# Patient Record
Sex: MALE | Race: ASIAN | NOT HISPANIC OR LATINO | URBAN - METROPOLITAN AREA
[De-identification: names, ages, dates, MRNs, and addresses within clinical notes are randomized per-mention and may not be internally consistent; named-entity substitution may affect disease eponyms.]

---

## 2017-01-20 ENCOUNTER — INPATIENT (INPATIENT)
Facility: HOSPITAL | Age: 42
LOS: 4 days | Discharge: HOME | End: 2017-01-25
Attending: INTERNAL MEDICINE | Admitting: INTERNAL MEDICINE
Payer: MEDICAID

## 2017-01-20 VITALS
SYSTOLIC BLOOD PRESSURE: 144 MMHG | DIASTOLIC BLOOD PRESSURE: 93 MMHG | RESPIRATION RATE: 18 BRPM | HEART RATE: 81 BPM | TEMPERATURE: 98 F | OXYGEN SATURATION: 100 %

## 2017-01-20 RX ORDER — KETOROLAC TROMETHAMINE 30 MG/ML
60 SYRINGE (ML) INJECTION ONCE
Qty: 0 | Refills: 0 | Status: DISCONTINUED | OUTPATIENT
Start: 2017-01-20 | End: 2017-01-20

## 2017-01-20 RX ORDER — DIAZEPAM 5 MG
1 TABLET ORAL
Qty: 8 | Refills: 0 | OUTPATIENT
Start: 2017-01-20 | End: 2017-01-24

## 2017-01-20 RX ORDER — IBUPROFEN 200 MG
1 TABLET ORAL
Qty: 20 | Refills: 0 | OUTPATIENT
Start: 2017-01-20 | End: 2017-01-25

## 2017-01-20 RX ADMIN — Medication 60 MILLIGRAM(S): at 23:27

## 2017-01-20 RX ADMIN — Medication 60 MILLIGRAM(S): at 22:01

## 2017-01-20 NOTE — ED PROVIDER NOTE - MEDICAL DECISION MAKING DETAILS
42 y/o M pt with PMHx of chronic back pain c/o sudden onset back pain x10 hours s/p exertion. Plan: pain medication including valium Toradol, apply ice pack and reassess

## 2017-01-20 NOTE — ED PROVIDER NOTE - OBJECTIVE STATEMENT
40 y/o M pt with PMHx of HTN BIB EMS for sudden onset of back pain s/p pushing a suitcase 10 hours ago worse with movement. States he has a hx of back pain. Pt had physical therapy last week. Denies weakness, numbness, fall, trauma or any other complaints.

## 2017-01-20 NOTE — ED PROVIDER NOTE - PROGRESS NOTE DETAILS
Pt unable to sit up due to pain or even roll side to side. Able to lift legs +4/5 strength due to pain b/l. Sensation intact. Pain persistent after Toradol, Oxycodone and Valium. Will give Morphine and valium. Signed out to me as admission pending basic labs. Labs MYLENEL. GENIE.

## 2017-01-20 NOTE — ED PROVIDER NOTE - NS ED MD SCRIBE ATTENDING SCRIBE SECTIONS
VITAL SIGNS( Pullset)/REVIEW OF SYSTEMS/PHYSICAL EXAM/HIV/PAST MEDICAL/SURGICAL/SOCIAL HISTORY/HISTORY OF PRESENT ILLNESS/DISPOSITION

## 2017-01-21 DIAGNOSIS — E78.00 PURE HYPERCHOLESTEROLEMIA, UNSPECIFIED: ICD-10-CM

## 2017-01-21 DIAGNOSIS — S39.012A STRAIN OF MUSCLE, FASCIA AND TENDON OF LOWER BACK, INITIAL ENCOUNTER: ICD-10-CM

## 2017-01-21 DIAGNOSIS — M54.5 LOW BACK PAIN: ICD-10-CM

## 2017-01-21 DIAGNOSIS — E11.9 TYPE 2 DIABETES MELLITUS WITHOUT COMPLICATIONS: ICD-10-CM

## 2017-01-21 DIAGNOSIS — I10 ESSENTIAL (PRIMARY) HYPERTENSION: ICD-10-CM

## 2017-01-21 LAB
ALBUMIN SERPL ELPH-MCNC: 4.3 G/DL — SIGNIFICANT CHANGE UP (ref 3.3–5)
ALP SERPL-CCNC: 73 U/L — SIGNIFICANT CHANGE UP (ref 40–120)
ALT FLD-CCNC: 64 U/L — HIGH (ref 4–41)
APPEARANCE UR: CLEAR — SIGNIFICANT CHANGE UP
APTT BLD: 30.5 SEC — SIGNIFICANT CHANGE UP (ref 27.5–37.4)
AST SERPL-CCNC: 38 U/L — SIGNIFICANT CHANGE UP (ref 4–40)
BASOPHILS # BLD AUTO: 0.04 K/UL — SIGNIFICANT CHANGE UP (ref 0–0.2)
BASOPHILS # BLD AUTO: 0.04 K/UL — SIGNIFICANT CHANGE UP (ref 0–0.2)
BASOPHILS NFR BLD AUTO: 0.4 % — SIGNIFICANT CHANGE UP (ref 0–2)
BASOPHILS NFR BLD AUTO: 0.6 % — SIGNIFICANT CHANGE UP (ref 0–2)
BILIRUB SERPL-MCNC: 0.4 MG/DL — SIGNIFICANT CHANGE UP (ref 0.2–1.2)
BILIRUB UR-MCNC: NEGATIVE — SIGNIFICANT CHANGE UP
BLOOD UR QL VISUAL: NEGATIVE — SIGNIFICANT CHANGE UP
BUN SERPL-MCNC: 12 MG/DL — SIGNIFICANT CHANGE UP (ref 7–23)
BUN SERPL-MCNC: 13 MG/DL — SIGNIFICANT CHANGE UP (ref 7–23)
CALCIUM SERPL-MCNC: 9.3 MG/DL — SIGNIFICANT CHANGE UP (ref 8.4–10.5)
CALCIUM SERPL-MCNC: 9.8 MG/DL — SIGNIFICANT CHANGE UP (ref 8.4–10.5)
CHLORIDE SERPL-SCNC: 97 MMOL/L — LOW (ref 98–107)
CHLORIDE SERPL-SCNC: 99 MMOL/L — SIGNIFICANT CHANGE UP (ref 98–107)
CHOLEST SERPL-MCNC: 167 MG/DL — SIGNIFICANT CHANGE UP (ref 120–199)
CK SERPL-CCNC: 137 U/L — SIGNIFICANT CHANGE UP (ref 30–200)
CO2 SERPL-SCNC: 25 MMOL/L — SIGNIFICANT CHANGE UP (ref 22–31)
CO2 SERPL-SCNC: 26 MMOL/L — SIGNIFICANT CHANGE UP (ref 22–31)
COLOR SPEC: YELLOW — SIGNIFICANT CHANGE UP
CREAT SERPL-MCNC: 0.83 MG/DL — SIGNIFICANT CHANGE UP (ref 0.5–1.3)
CREAT SERPL-MCNC: 0.91 MG/DL — SIGNIFICANT CHANGE UP (ref 0.5–1.3)
CRP SERPL HS-MCNC: 5.6 MG/L — SIGNIFICANT CHANGE UP
CRP SERPL-MCNC: 5.9 MG/L — HIGH (ref 0.3–5)
EOSINOPHIL # BLD AUTO: 0.11 K/UL — SIGNIFICANT CHANGE UP (ref 0–0.5)
EOSINOPHIL # BLD AUTO: 0.12 K/UL — SIGNIFICANT CHANGE UP (ref 0–0.5)
EOSINOPHIL NFR BLD AUTO: 1.3 % — SIGNIFICANT CHANGE UP (ref 0–6)
EOSINOPHIL NFR BLD AUTO: 1.5 % — SIGNIFICANT CHANGE UP (ref 0–6)
ERYTHROCYTE [SEDIMENTATION RATE] IN BLOOD: 12 MM/HR — SIGNIFICANT CHANGE UP (ref 1–15)
FERRITIN SERPL-MCNC: 128.9 NG/ML — SIGNIFICANT CHANGE UP (ref 30–400)
FOLATE SERPL-MCNC: 12.4 NG/ML — SIGNIFICANT CHANGE UP (ref 4.7–20)
GLUCOSE SERPL-MCNC: 108 MG/DL — HIGH (ref 70–99)
GLUCOSE SERPL-MCNC: 125 MG/DL — HIGH (ref 70–99)
GLUCOSE UR-MCNC: NEGATIVE — SIGNIFICANT CHANGE UP
HBA1C BLD-MCNC: 7.1 % — HIGH (ref 4–5.6)
HCT VFR BLD CALC: 42.3 % — SIGNIFICANT CHANGE UP (ref 39–50)
HCT VFR BLD CALC: 42.7 % — SIGNIFICANT CHANGE UP (ref 39–50)
HDLC SERPL-MCNC: 32 MG/DL — LOW (ref 35–55)
HGB BLD-MCNC: 13.7 G/DL — SIGNIFICANT CHANGE UP (ref 13–17)
HGB BLD-MCNC: 13.7 G/DL — SIGNIFICANT CHANGE UP (ref 13–17)
HYALINE CASTS # UR AUTO: SIGNIFICANT CHANGE UP (ref 0–?)
IMM GRANULOCYTES NFR BLD AUTO: 0.4 % — SIGNIFICANT CHANGE UP (ref 0–1.5)
IMM GRANULOCYTES NFR BLD AUTO: 0.6 % — SIGNIFICANT CHANGE UP (ref 0–1.5)
INR BLD: 1.09 — SIGNIFICANT CHANGE UP (ref 0.87–1.18)
IRON SATN MFR SERPL: 373 UG/DL — SIGNIFICANT CHANGE UP (ref 155–535)
IRON SATN MFR SERPL: 80 UG/DL — SIGNIFICANT CHANGE UP (ref 45–165)
KETONES UR-MCNC: NEGATIVE — SIGNIFICANT CHANGE UP
LEUKOCYTE ESTERASE UR-ACNC: NEGATIVE — SIGNIFICANT CHANGE UP
LIPID PNL WITH DIRECT LDL SERPL: 115 MG/DL — SIGNIFICANT CHANGE UP
LYMPHOCYTES # BLD AUTO: 2.86 K/UL — SIGNIFICANT CHANGE UP (ref 1–3.3)
LYMPHOCYTES # BLD AUTO: 3.61 K/UL — HIGH (ref 1–3.3)
LYMPHOCYTES # BLD AUTO: 38.3 % — SIGNIFICANT CHANGE UP (ref 13–44)
LYMPHOCYTES # BLD AUTO: 39.4 % — SIGNIFICANT CHANGE UP (ref 13–44)
MAGNESIUM SERPL-MCNC: 2.1 MG/DL — SIGNIFICANT CHANGE UP (ref 1.6–2.6)
MCHC RBC-ENTMCNC: 26.7 PG — LOW (ref 27–34)
MCHC RBC-ENTMCNC: 26.9 PG — LOW (ref 27–34)
MCHC RBC-ENTMCNC: 32.1 % — SIGNIFICANT CHANGE UP (ref 32–36)
MCHC RBC-ENTMCNC: 32.4 % — SIGNIFICANT CHANGE UP (ref 32–36)
MCV RBC AUTO: 82.9 FL — SIGNIFICANT CHANGE UP (ref 80–100)
MCV RBC AUTO: 83.1 FL — SIGNIFICANT CHANGE UP (ref 80–100)
MONOCYTES # BLD AUTO: 0.63 K/UL — SIGNIFICANT CHANGE UP (ref 0–0.9)
MONOCYTES # BLD AUTO: 0.64 K/UL — SIGNIFICANT CHANGE UP (ref 0–0.9)
MONOCYTES NFR BLD AUTO: 6.7 % — SIGNIFICANT CHANGE UP (ref 2–14)
MONOCYTES NFR BLD AUTO: 8.8 % — SIGNIFICANT CHANGE UP (ref 2–14)
MUCOUS THREADS # UR AUTO: SIGNIFICANT CHANGE UP
NEUTROPHILS # BLD AUTO: 3.56 K/UL — SIGNIFICANT CHANGE UP (ref 1.8–7.4)
NEUTROPHILS # BLD AUTO: 4.99 K/UL — SIGNIFICANT CHANGE UP (ref 1.8–7.4)
NEUTROPHILS NFR BLD AUTO: 49.1 % — SIGNIFICANT CHANGE UP (ref 43–77)
NEUTROPHILS NFR BLD AUTO: 52.9 % — SIGNIFICANT CHANGE UP (ref 43–77)
NITRITE UR-MCNC: NEGATIVE — SIGNIFICANT CHANGE UP
NON-SQ EPI CELLS # UR AUTO: <1 — SIGNIFICANT CHANGE UP
PH UR: 6.5 — SIGNIFICANT CHANGE UP (ref 4.6–8)
PHOSPHATE SERPL-MCNC: 4.4 MG/DL — SIGNIFICANT CHANGE UP (ref 2.5–4.5)
PLATELET # BLD AUTO: 277 K/UL — SIGNIFICANT CHANGE UP (ref 150–400)
PLATELET # BLD AUTO: 286 K/UL — SIGNIFICANT CHANGE UP (ref 150–400)
PMV BLD: 11.4 FL — SIGNIFICANT CHANGE UP (ref 7–13)
PMV BLD: 12 FL — SIGNIFICANT CHANGE UP (ref 7–13)
POTASSIUM SERPL-MCNC: 4 MMOL/L — SIGNIFICANT CHANGE UP (ref 3.5–5.3)
POTASSIUM SERPL-MCNC: 4.1 MMOL/L — SIGNIFICANT CHANGE UP (ref 3.5–5.3)
POTASSIUM SERPL-SCNC: 4 MMOL/L — SIGNIFICANT CHANGE UP (ref 3.5–5.3)
POTASSIUM SERPL-SCNC: 4.1 MMOL/L — SIGNIFICANT CHANGE UP (ref 3.5–5.3)
PROT SERPL-MCNC: 7.5 G/DL — SIGNIFICANT CHANGE UP (ref 6–8.3)
PROT UR-MCNC: NEGATIVE — SIGNIFICANT CHANGE UP
PROTHROM AB SERPL-ACNC: 12.4 SEC — SIGNIFICANT CHANGE UP (ref 10–13.1)
RBC # BLD: 5.1 M/UL — SIGNIFICANT CHANGE UP (ref 4.2–5.8)
RBC # BLD: 5.14 M/UL — SIGNIFICANT CHANGE UP (ref 4.2–5.8)
RBC # FLD: 13.5 % — SIGNIFICANT CHANGE UP (ref 10.3–14.5)
RBC # FLD: 13.6 % — SIGNIFICANT CHANGE UP (ref 10.3–14.5)
RHEUMATOID FACT SERPL-ACNC: 7.4 IU/ML — SIGNIFICANT CHANGE UP
SODIUM SERPL-SCNC: 139 MMOL/L — SIGNIFICANT CHANGE UP (ref 135–145)
SODIUM SERPL-SCNC: 140 MMOL/L — SIGNIFICANT CHANGE UP (ref 135–145)
SP GR SPEC: 1.01 — SIGNIFICANT CHANGE UP (ref 1–1.03)
TRIGL SERPL-MCNC: 178 MG/DL — HIGH (ref 10–149)
TSH SERPL-MCNC: 4.05 UIU/ML — SIGNIFICANT CHANGE UP (ref 0.27–4.2)
UIBC SERPL-MCNC: 293 UG/DL — SIGNIFICANT CHANGE UP (ref 110–370)
UROBILINOGEN FLD QL: NORMAL E.U. — SIGNIFICANT CHANGE UP (ref 0.1–0.2)
VIT B12 SERPL-MCNC: 700 PG/ML — SIGNIFICANT CHANGE UP (ref 200–900)
WBC # BLD: 7.25 K/UL — SIGNIFICANT CHANGE UP (ref 3.8–10.5)
WBC # BLD: 9.43 K/UL — SIGNIFICANT CHANGE UP (ref 3.8–10.5)
WBC # FLD AUTO: 7.25 K/UL — SIGNIFICANT CHANGE UP (ref 3.8–10.5)
WBC # FLD AUTO: 9.43 K/UL — SIGNIFICANT CHANGE UP (ref 3.8–10.5)
WBC UR QL: SIGNIFICANT CHANGE UP (ref 0–?)

## 2017-01-21 PROCEDURE — 99223 1ST HOSP IP/OBS HIGH 75: CPT

## 2017-01-21 PROCEDURE — 72100 X-RAY EXAM L-S SPINE 2/3 VWS: CPT | Mod: 26

## 2017-01-21 PROCEDURE — 71010: CPT | Mod: 26

## 2017-01-21 RX ORDER — INSULIN LISPRO 100/ML
VIAL (ML) SUBCUTANEOUS
Qty: 0 | Refills: 0 | Status: DISCONTINUED | OUTPATIENT
Start: 2017-01-21 | End: 2017-01-25

## 2017-01-21 RX ORDER — DOCUSATE SODIUM 100 MG
100 CAPSULE ORAL THREE TIMES A DAY
Qty: 0 | Refills: 0 | Status: DISCONTINUED | OUTPATIENT
Start: 2017-01-21 | End: 2017-01-25

## 2017-01-21 RX ORDER — SENNA PLUS 8.6 MG/1
2 TABLET ORAL AT BEDTIME
Qty: 0 | Refills: 0 | Status: DISCONTINUED | OUTPATIENT
Start: 2017-01-21 | End: 2017-01-25

## 2017-01-21 RX ORDER — DILTIAZEM HCL 120 MG
120 CAPSULE, EXT RELEASE 24 HR ORAL DAILY
Qty: 0 | Refills: 0 | Status: DISCONTINUED | OUTPATIENT
Start: 2017-01-21 | End: 2017-01-25

## 2017-01-21 RX ORDER — SIMVASTATIN 20 MG/1
1 TABLET, FILM COATED ORAL
Qty: 0 | Refills: 0 | COMMUNITY

## 2017-01-21 RX ORDER — MORPHINE SULFATE 50 MG/1
2 CAPSULE, EXTENDED RELEASE ORAL EVERY 4 HOURS
Qty: 0 | Refills: 0 | Status: DISCONTINUED | OUTPATIENT
Start: 2017-01-21 | End: 2017-01-22

## 2017-01-21 RX ORDER — DEXTROSE 50 % IN WATER 50 %
12.5 SYRINGE (ML) INTRAVENOUS ONCE
Qty: 0 | Refills: 0 | Status: DISCONTINUED | OUTPATIENT
Start: 2017-01-21 | End: 2017-01-25

## 2017-01-21 RX ORDER — LIDOCAINE 4 G/100G
2 CREAM TOPICAL DAILY
Qty: 0 | Refills: 0 | Status: DISCONTINUED | OUTPATIENT
Start: 2017-01-21 | End: 2017-01-25

## 2017-01-21 RX ORDER — INFLUENZA VIRUS VACCINE 15; 15; 15; 15 UG/.5ML; UG/.5ML; UG/.5ML; UG/.5ML
0.5 SUSPENSION INTRAMUSCULAR ONCE
Qty: 0 | Refills: 0 | Status: COMPLETED | OUTPATIENT
Start: 2017-01-21 | End: 2017-01-21

## 2017-01-21 RX ORDER — DEXTROSE 50 % IN WATER 50 %
1 SYRINGE (ML) INTRAVENOUS ONCE
Qty: 0 | Refills: 0 | Status: DISCONTINUED | OUTPATIENT
Start: 2017-01-21 | End: 2017-01-25

## 2017-01-21 RX ORDER — DEXTROSE 50 % IN WATER 50 %
25 SYRINGE (ML) INTRAVENOUS ONCE
Qty: 0 | Refills: 0 | Status: DISCONTINUED | OUTPATIENT
Start: 2017-01-21 | End: 2017-01-25

## 2017-01-21 RX ORDER — HEPARIN SODIUM 5000 [USP'U]/ML
5000 INJECTION INTRAVENOUS; SUBCUTANEOUS EVERY 8 HOURS
Qty: 0 | Refills: 0 | Status: DISCONTINUED | OUTPATIENT
Start: 2017-01-21 | End: 2017-01-25

## 2017-01-21 RX ORDER — METFORMIN HYDROCHLORIDE 850 MG/1
250 TABLET ORAL
Qty: 0 | Refills: 0 | COMMUNITY

## 2017-01-21 RX ORDER — MORPHINE SULFATE 50 MG/1
4 CAPSULE, EXTENDED RELEASE ORAL EVERY 4 HOURS
Qty: 0 | Refills: 0 | Status: DISCONTINUED | OUTPATIENT
Start: 2017-01-21 | End: 2017-01-22

## 2017-01-21 RX ORDER — MORPHINE SULFATE 50 MG/1
4 CAPSULE, EXTENDED RELEASE ORAL ONCE
Qty: 0 | Refills: 0 | Status: DISCONTINUED | OUTPATIENT
Start: 2017-01-21 | End: 2017-01-21

## 2017-01-21 RX ORDER — INSULIN LISPRO 100/ML
VIAL (ML) SUBCUTANEOUS AT BEDTIME
Qty: 0 | Refills: 0 | Status: DISCONTINUED | OUTPATIENT
Start: 2017-01-21 | End: 2017-01-25

## 2017-01-21 RX ORDER — CYCLOBENZAPRINE HYDROCHLORIDE 10 MG/1
10 TABLET, FILM COATED ORAL
Qty: 0 | Refills: 0 | Status: DISCONTINUED | OUTPATIENT
Start: 2017-01-21 | End: 2017-01-25

## 2017-01-21 RX ORDER — GLUCAGON INJECTION, SOLUTION 0.5 MG/.1ML
1 INJECTION, SOLUTION SUBCUTANEOUS ONCE
Qty: 0 | Refills: 0 | Status: DISCONTINUED | OUTPATIENT
Start: 2017-01-21 | End: 2017-01-25

## 2017-01-21 RX ORDER — SIMVASTATIN 20 MG/1
20 TABLET, FILM COATED ORAL AT BEDTIME
Qty: 0 | Refills: 0 | Status: DISCONTINUED | OUTPATIENT
Start: 2017-01-21 | End: 2017-01-25

## 2017-01-21 RX ORDER — ONDANSETRON 8 MG/1
4 TABLET, FILM COATED ORAL EVERY 8 HOURS
Qty: 0 | Refills: 0 | Status: DISCONTINUED | OUTPATIENT
Start: 2017-01-21 | End: 2017-01-25

## 2017-01-21 RX ORDER — DIAZEPAM 5 MG
5 TABLET ORAL
Qty: 0 | Refills: 0 | Status: DISCONTINUED | OUTPATIENT
Start: 2017-01-21 | End: 2017-01-25

## 2017-01-21 RX ORDER — SODIUM CHLORIDE 9 MG/ML
1000 INJECTION, SOLUTION INTRAVENOUS
Qty: 0 | Refills: 0 | Status: DISCONTINUED | OUTPATIENT
Start: 2017-01-21 | End: 2017-01-25

## 2017-01-21 RX ADMIN — MORPHINE SULFATE 4 MILLIGRAM(S): 50 CAPSULE, EXTENDED RELEASE ORAL at 07:19

## 2017-01-21 RX ADMIN — Medication 120 MILLIGRAM(S): at 07:17

## 2017-01-21 RX ADMIN — LIDOCAINE 2 PATCH: 4 CREAM TOPICAL at 19:57

## 2017-01-21 RX ADMIN — CYCLOBENZAPRINE HYDROCHLORIDE 10 MILLIGRAM(S): 10 TABLET, FILM COATED ORAL at 18:08

## 2017-01-21 RX ADMIN — HEPARIN SODIUM 5000 UNIT(S): 5000 INJECTION INTRAVENOUS; SUBCUTANEOUS at 07:17

## 2017-01-21 RX ADMIN — HEPARIN SODIUM 5000 UNIT(S): 5000 INJECTION INTRAVENOUS; SUBCUTANEOUS at 22:31

## 2017-01-21 RX ADMIN — MORPHINE SULFATE 4 MILLIGRAM(S): 50 CAPSULE, EXTENDED RELEASE ORAL at 05:22

## 2017-01-21 RX ADMIN — HEPARIN SODIUM 5000 UNIT(S): 5000 INJECTION INTRAVENOUS; SUBCUTANEOUS at 16:06

## 2017-01-21 RX ADMIN — Medication 100 MILLIGRAM(S): at 07:17

## 2017-01-21 RX ADMIN — LIDOCAINE 2 PATCH: 4 CREAM TOPICAL at 07:19

## 2017-01-21 RX ADMIN — SIMVASTATIN 20 MILLIGRAM(S): 20 TABLET, FILM COATED ORAL at 22:31

## 2017-01-21 RX ADMIN — Medication 100 MILLIGRAM(S): at 22:31

## 2017-01-21 RX ADMIN — Medication 5 MILLIGRAM(S): at 18:08

## 2017-01-21 RX ADMIN — MORPHINE SULFATE 4 MILLIGRAM(S): 50 CAPSULE, EXTENDED RELEASE ORAL at 16:16

## 2017-01-21 RX ADMIN — Medication 100 MILLIGRAM(S): at 16:06

## 2017-01-21 NOTE — H&P ADULT. - MUSCULOSKELETAL
details… detailed exam decreased ROM due to pain/no joint warmth/no joint erythema/no joint swelling/no calf tenderness

## 2017-01-21 NOTE — H&P ADULT. - ASSESSMENT
42 y/o male EX Smoker, HTN, DM, High Cholesterol, pt c/o sudden severe LBP x one day, while he was pushing a heavy Suitcase yesterday  at home, No Radiation to legs, no numbness in legs, but cannot  move due to severe lumbar spine pain, pain worse with movements and raising his legs, no fever, no rash,  no CP, NO SOB, no cough, no incontinent, no dysuria, no diarrhea, no constipation,   No HA, no dizziness, no abdominal pain, A+O x3, pt had LBP 6-7 months ago due to heavy lifting but improved with PT, pt never had X Ray of L/S or MRI of L/S, I ordered X Ray and MRI of L/S R/O Disc Herniation or FX, pt s/p Valium 5 mg PO x 2 doses, IV Morphine 4 mg X 1 , Toradol IV X 1 and Percocet PO x 1 so far with little help,

## 2017-01-21 NOTE — H&P ADULT. - HISTORY OF PRESENT ILLNESS
42 y/o male EX Smoker, HTN, DM, High Cholesterol, pt c/o sudden severe LBP x one day, while he was pushing a heavy Suitcase yesterday  at home, No Radiation to legs, no numbness in legs, but cannot  move due to severe lumbar spine pain, pain worse with movements and raising his legs, no fever, no rash,  no CP, NO SOB, no cough, no incontinent, no dysuria, no diarrhea, no constipation,   No HA, no dizziness, no abdominal pain, A+O x3, pt had LBP 6-7 months ago due to heavy lifting but improved with PT, pt never had X Ray of L/S or MRI of L/S, I ordered X Ray and MRI of L/S R/O Disc Herniation or FX, pt s/p Valium 5 mg PO x 2 doses, IV Morphine 4 mg X 1 , Toradol IV X 1 and Percocet PO x 1 so far with little help,     Labs: Na 139, K+ 4.1, BUN 12, Creatinine 0.91, Glucose 125, WBC 9.43, Hgb 13.7, Platelet 286    Vitals: Tem 98, HR 78, /76, RR 18, 98 % RA

## 2017-01-21 NOTE — PATIENT PROFILE ADULT. - LIVES WITH, PROFILE
(2 children - 5 y/o and 3 y/o), apartment, 8-9 steps-to-enter/exit into lobby w/ U/L handrail, (+) elevator access/children/spouse

## 2017-01-21 NOTE — ED ADULT NURSE REASSESSMENT NOTE - NS ED NURSE REASSESS COMMENT FT1
Upon assessment, pt is aox3. No acute distress. Reports that back pain is improving. Went back to sleep.

## 2017-01-21 NOTE — H&P ADULT. - PROBLEM SELECTOR PLAN 1
R/O FX, R/O Disc Herniation, R/O Spinal Stenosis  X ray L/S, MRI L/S no contrast, Hold PT for now until imaging done  Pain control, Colace, Senna, UA, IV Morphine PRN, PO Valium, PO Flexeril,   Fall/aspiration precaution   Lidoderm Patch   FABIAN, ESR, CRP, CBC, CMP, RF , CPK, Anti Marley-1 Ab

## 2017-01-21 NOTE — H&P ADULT. - ATTENDING COMMENTS
Pt was seen & Examined by me, Dr. KENDRA Loyola on 1/21/2017.    Dr. Clayton will resume the care of pt  from this morning.

## 2017-01-21 NOTE — ED ADULT NURSE NOTE - OBJECTIVE STATEMENT
Pt received to intake 10c aaox4 non-ambulatory d/t pain c/o sudden onset back pain while pushing luggage  Pt reports hx of chronic back pain and HTN.  Pt denies any numbness or tingling in extremities, exhibits full ROM but pain increases with movement.  Positive peripheral pulses palpated bilaterally.  Labs and meds as ordered.  Will continue to monitor.

## 2017-01-22 LAB
ALBUMIN SERPL ELPH-MCNC: 4.4 G/DL — SIGNIFICANT CHANGE UP (ref 3.3–5)
ALP SERPL-CCNC: 75 U/L — SIGNIFICANT CHANGE UP (ref 40–120)
ALT FLD-CCNC: 81 U/L — HIGH (ref 4–41)
AST SERPL-CCNC: 46 U/L — HIGH (ref 4–40)
BASOPHILS # BLD AUTO: 0.03 K/UL — SIGNIFICANT CHANGE UP (ref 0–0.2)
BASOPHILS NFR BLD AUTO: 0.4 % — SIGNIFICANT CHANGE UP (ref 0–2)
BILIRUB SERPL-MCNC: 0.4 MG/DL — SIGNIFICANT CHANGE UP (ref 0.2–1.2)
BUN SERPL-MCNC: 16 MG/DL — SIGNIFICANT CHANGE UP (ref 7–23)
CALCIUM SERPL-MCNC: 9.4 MG/DL — SIGNIFICANT CHANGE UP (ref 8.4–10.5)
CHLORIDE SERPL-SCNC: 99 MMOL/L — SIGNIFICANT CHANGE UP (ref 98–107)
CK SERPL-CCNC: 108 U/L — SIGNIFICANT CHANGE UP (ref 30–200)
CO2 SERPL-SCNC: 26 MMOL/L — SIGNIFICANT CHANGE UP (ref 22–31)
CREAT SERPL-MCNC: 0.9 MG/DL — SIGNIFICANT CHANGE UP (ref 0.5–1.3)
ENA JO1 AB SER-ACNC: <0.2 ZZ — SIGNIFICANT CHANGE UP
EOSINOPHIL # BLD AUTO: 0.07 K/UL — SIGNIFICANT CHANGE UP (ref 0–0.5)
EOSINOPHIL NFR BLD AUTO: 1 % — SIGNIFICANT CHANGE UP (ref 0–6)
GLUCOSE SERPL-MCNC: 112 MG/DL — HIGH (ref 70–99)
HAV IGM SER-ACNC: NONREACTIVE — SIGNIFICANT CHANGE UP
HBV CORE IGM SER-ACNC: NONREACTIVE — SIGNIFICANT CHANGE UP
HBV SURFACE AG SER-ACNC: NONREACTIVE — SIGNIFICANT CHANGE UP
HCT VFR BLD CALC: 43.3 % — SIGNIFICANT CHANGE UP (ref 39–50)
HCV AB S/CO SERPL IA: 0.38 S/CO — SIGNIFICANT CHANGE UP
HCV AB SERPL-IMP: SIGNIFICANT CHANGE UP
HGB BLD-MCNC: 13.7 G/DL — SIGNIFICANT CHANGE UP (ref 13–17)
IMM GRANULOCYTES NFR BLD AUTO: 0.7 % — SIGNIFICANT CHANGE UP (ref 0–1.5)
LYMPHOCYTES # BLD AUTO: 2.97 K/UL — SIGNIFICANT CHANGE UP (ref 1–3.3)
LYMPHOCYTES # BLD AUTO: 40.7 % — SIGNIFICANT CHANGE UP (ref 13–44)
MAGNESIUM SERPL-MCNC: 2.2 MG/DL — SIGNIFICANT CHANGE UP (ref 1.6–2.6)
MCHC RBC-ENTMCNC: 26.2 PG — LOW (ref 27–34)
MCHC RBC-ENTMCNC: 31.6 % — LOW (ref 32–36)
MCV RBC AUTO: 83 FL — SIGNIFICANT CHANGE UP (ref 80–100)
MONOCYTES # BLD AUTO: 0.42 K/UL — SIGNIFICANT CHANGE UP (ref 0–0.9)
MONOCYTES NFR BLD AUTO: 5.8 % — SIGNIFICANT CHANGE UP (ref 2–14)
NEUTROPHILS # BLD AUTO: 3.76 K/UL — SIGNIFICANT CHANGE UP (ref 1.8–7.4)
NEUTROPHILS NFR BLD AUTO: 51.4 % — SIGNIFICANT CHANGE UP (ref 43–77)
PHOSPHATE SERPL-MCNC: 4.4 MG/DL — SIGNIFICANT CHANGE UP (ref 2.5–4.5)
PLATELET # BLD AUTO: 269 K/UL — SIGNIFICANT CHANGE UP (ref 150–400)
PMV BLD: 11.8 FL — SIGNIFICANT CHANGE UP (ref 7–13)
POTASSIUM SERPL-MCNC: 4.8 MMOL/L — SIGNIFICANT CHANGE UP (ref 3.5–5.3)
POTASSIUM SERPL-SCNC: 4.8 MMOL/L — SIGNIFICANT CHANGE UP (ref 3.5–5.3)
PROT SERPL-MCNC: 7.2 G/DL — SIGNIFICANT CHANGE UP (ref 6–8.3)
RBC # BLD: 5.22 M/UL — SIGNIFICANT CHANGE UP (ref 4.2–5.8)
RBC # FLD: 13.6 % — SIGNIFICANT CHANGE UP (ref 10.3–14.5)
SODIUM SERPL-SCNC: 140 MMOL/L — SIGNIFICANT CHANGE UP (ref 135–145)
T4 FREE SERPL-MCNC: 1.19 NG/DL — SIGNIFICANT CHANGE UP (ref 0.9–1.8)
WBC # BLD: 7.3 K/UL — SIGNIFICANT CHANGE UP (ref 3.8–10.5)
WBC # FLD AUTO: 7.3 K/UL — SIGNIFICANT CHANGE UP (ref 3.8–10.5)

## 2017-01-22 RX ORDER — OXYCODONE HYDROCHLORIDE 5 MG/1
15 TABLET ORAL EVERY 12 HOURS
Qty: 0 | Refills: 0 | Status: DISCONTINUED | OUTPATIENT
Start: 2017-01-22 | End: 2017-01-22

## 2017-01-22 RX ORDER — OXYCODONE HYDROCHLORIDE 5 MG/1
10 TABLET ORAL EVERY 12 HOURS
Qty: 0 | Refills: 0 | Status: DISCONTINUED | OUTPATIENT
Start: 2017-01-22 | End: 2017-01-25

## 2017-01-22 RX ORDER — OXYCODONE HYDROCHLORIDE 5 MG/1
5 TABLET ORAL EVERY 6 HOURS
Qty: 0 | Refills: 0 | Status: DISCONTINUED | OUTPATIENT
Start: 2017-01-22 | End: 2017-01-25

## 2017-01-22 RX ADMIN — Medication 100 MILLIGRAM(S): at 13:08

## 2017-01-22 RX ADMIN — MORPHINE SULFATE 2 MILLIGRAM(S): 50 CAPSULE, EXTENDED RELEASE ORAL at 09:23

## 2017-01-22 RX ADMIN — CYCLOBENZAPRINE HYDROCHLORIDE 10 MILLIGRAM(S): 10 TABLET, FILM COATED ORAL at 17:59

## 2017-01-22 RX ADMIN — OXYCODONE HYDROCHLORIDE 10 MILLIGRAM(S): 5 TABLET ORAL at 17:59

## 2017-01-22 RX ADMIN — Medication 5 MILLIGRAM(S): at 17:59

## 2017-01-22 RX ADMIN — HEPARIN SODIUM 5000 UNIT(S): 5000 INJECTION INTRAVENOUS; SUBCUTANEOUS at 13:08

## 2017-01-22 RX ADMIN — Medication 100 MILLIGRAM(S): at 21:07

## 2017-01-22 RX ADMIN — LIDOCAINE 2 PATCH: 4 CREAM TOPICAL at 12:19

## 2017-01-22 RX ADMIN — Medication 120 MILLIGRAM(S): at 05:49

## 2017-01-22 RX ADMIN — HEPARIN SODIUM 5000 UNIT(S): 5000 INJECTION INTRAVENOUS; SUBCUTANEOUS at 21:07

## 2017-01-22 RX ADMIN — HEPARIN SODIUM 5000 UNIT(S): 5000 INJECTION INTRAVENOUS; SUBCUTANEOUS at 05:49

## 2017-01-22 RX ADMIN — Medication 5 MILLIGRAM(S): at 05:49

## 2017-01-22 RX ADMIN — CYCLOBENZAPRINE HYDROCHLORIDE 10 MILLIGRAM(S): 10 TABLET, FILM COATED ORAL at 05:49

## 2017-01-22 RX ADMIN — SIMVASTATIN 20 MILLIGRAM(S): 20 TABLET, FILM COATED ORAL at 21:07

## 2017-01-22 RX ADMIN — Medication 100 MILLIGRAM(S): at 05:49

## 2017-01-22 RX ADMIN — MORPHINE SULFATE 2 MILLIGRAM(S): 50 CAPSULE, EXTENDED RELEASE ORAL at 09:45

## 2017-01-22 NOTE — DISCHARGE NOTE ADULT - HOSPITAL COURSE
40 y/o male EX Smoker, HTN, DM, High Cholesterol, pt c/o sudden severe LBP x one day, while he was pushing a heavy Suitcase yesterday  at home, No Radiation to legs, no numbness in legs, but cannot  move due to severe lumbar spine pain, pain worse with movements and raising his legs, no fever, no rash,  no CP, NO SOB, no cough, no incontinent, no dysuria, no diarrhea, no constipation,     Hospital Course:    ACUTE LUMBAR BACK PAIN   - R/O FX, R/O Disc Herniation, R/O Spinal Stenosis  -X ray L/S, No evidence of acute compression deformity or traumatic malalignment of  the lumbar spine.  -CXR:There are no focal lung consolidations.  -MRI L-Spine: Posterior annular tear at L4-L5 and L5-S1. At L4-L5, there is a small central disc protrusion with mild canal and bilateral foraminal narrowing.  -Pain control  -Fall/aspiration precaution   -Lidoderm Patch   -VANE-1 AB: neg   Evaluated by neuro spine, rec for pain control, PT, WBAT and outpatient follow up  Physical Therapy:  WBAT, rec for outpatient PT    HTN   -Cardizem CD,     DM  -A1C: 7.0  -monitor FS  -corrective sliding scale   Restart metformin upon d/c    dispo: home with outpatient PT

## 2017-01-22 NOTE — DISCHARGE NOTE ADULT - PROVIDER TOKENS
FREE:[LAST:[Maryjane],FIRST:[Jayashree],PHONE:[phone]],FAX:[(   )    -]],TOKEN:'[id number]'

## 2017-01-22 NOTE — DISCHARGE NOTE ADULT - MEDICATION SUMMARY - MEDICATIONS TO TAKE
I will START or STAY ON the medications listed below when I get home from the hospital:    oxyCODONE 10 mg oral tablet, extended release  -- 1 tab(s) by mouth every 12 hours MDD:2 tablets  -- Indication: For pain    oxyCODONE 5 mg oral tablet  -- 1 tab(s) by mouth every 6 hours, As needed, moderate to severe pain MDD:4 tablets  -- Indication: For pain    Diltiazem Hydrochloride  mg/24 hours oral capsule, extended release  -- 1 cap(s) by mouth once a day  -- Indication: For Hypertension    metFORMIN  -- 250 milligram(s) by mouth once a day  -- Indication: For Diabetes    Zocor 20 mg oral tablet  -- 1 tab(s) by mouth once a day (at bedtime)  -- Indication: For Hyperlipidemia    senna oral tablet  -- 2 tab(s) by mouth once a day (at bedtime), As needed, Constipation  -- Indication: For Constipation    docusate sodium 100 mg oral capsule  -- 1 cap(s) by mouth 3 times a day  -- Indication: For Constipation    cyclobenzaprine 10 mg oral tablet  -- 1 tab(s) by mouth 2 times a day  -- Indication: For muscle pain

## 2017-01-22 NOTE — DISCHARGE NOTE ADULT - CARE PROVIDER_API CALL
Jayashree Wesley  Phone: [phone]  Fax: (   )    -    Enrique Davis), Orthopaedic Surgery  [address] [place] [address]  Phone: [phone]  Fax: [phone]

## 2017-01-22 NOTE — DISCHARGE NOTE ADULT - PATIENT PORTAL LINK FT
“You can access the FollowHealth Patient Portal, offered by [facility], by registering with the following website: [email or web address]”

## 2017-01-22 NOTE — DISCHARGE NOTE ADULT - PLAN OF CARE
improved During your stay images of your back were obtained.    You were evaluated by an orthopedic spine doctor during your stay who determined that your can ambulate as tolerated.  You were evaluated by physical therapy and they recommended outpatient physical therapy to help improve your status. Continue with a diabetic, consistent carbohydrate diet  Continue metformin as prescribed  Follow up with your primary care doctor for review of medications and further diabetic management continue taking cardizem as prescribed  monitor your blood pressure  follow up with your primary care doctor continue taking your zocor as prescribed During your stay images of your back were obtained.    You were evaluated by an orthopedic spine doctor during your stay who determined that your can ambulate as tolerated.  You were evaluated by physical therapy and they recommended outpatient physical therapy to help improve your status.  Follow up with your primary care doct Dr. Wesley for further medical management

## 2017-01-22 NOTE — DISCHARGE NOTE ADULT - CARE PROVIDERS DIRECT ADDRESSES
,DirectAddress_Unknown,[email or web address],DirectAddress_Unknown

## 2017-01-22 NOTE — DISCHARGE NOTE ADULT - CARE PLAN
Principal Discharge DX:	Acute lumbar back pain  Goal:	improved  Instructions for follow-up, activity and diet:	During your stay images of your back were obtained.    You were evaluated by an orthopedic spine doctor during your stay who determined that your can ambulate as tolerated.  You were evaluated by physical therapy and they recommended outpatient physical therapy to help improve your status.  Secondary Diagnosis:	HTN (hypertension)  Instructions for follow-up, activity and diet:	continue taking cardizem as prescribed  monitor your blood pressure  follow up with your primary care doctor  Secondary Diagnosis:	High cholesterol  Instructions for follow-up, activity and diet:	continue taking your zocor as prescribed  Secondary Diagnosis:	Diabetes  Instructions for follow-up, activity and diet:	Continue with a diabetic, consistent carbohydrate diet  Continue metformin as prescribed  Follow up with your primary care doctor for review of medications and further diabetic management Principal Discharge DX:	Acute lumbar back pain  Goal:	improved  Instructions for follow-up, activity and diet:	During your stay images of your back were obtained.    You were evaluated by an orthopedic spine doctor during your stay who determined that your can ambulate as tolerated.  You were evaluated by physical therapy and they recommended outpatient physical therapy to help improve your status.  Follow up with your primary care doct Dr. Wesley for further medical management  Secondary Diagnosis:	HTN (hypertension)  Instructions for follow-up, activity and diet:	continue taking cardizem as prescribed  monitor your blood pressure  follow up with your primary care doctor  Secondary Diagnosis:	High cholesterol  Instructions for follow-up, activity and diet:	continue taking your zocor as prescribed  Secondary Diagnosis:	Diabetes  Instructions for follow-up, activity and diet:	Continue with a diabetic, consistent carbohydrate diet  Continue metformin as prescribed  Follow up with your primary care doctor for review of medications and further diabetic management

## 2017-01-23 LAB
BUN SERPL-MCNC: 13 MG/DL — SIGNIFICANT CHANGE UP (ref 7–23)
CALCIUM SERPL-MCNC: 8.9 MG/DL — SIGNIFICANT CHANGE UP (ref 8.4–10.5)
CHLORIDE SERPL-SCNC: 98 MMOL/L — SIGNIFICANT CHANGE UP (ref 98–107)
CO2 SERPL-SCNC: 24 MMOL/L — SIGNIFICANT CHANGE UP (ref 22–31)
CREAT SERPL-MCNC: 0.84 MG/DL — SIGNIFICANT CHANGE UP (ref 0.5–1.3)
GLUCOSE SERPL-MCNC: 107 MG/DL — HIGH (ref 70–99)
HBA1C BLD-MCNC: 7 % — HIGH (ref 4–5.6)
HCT VFR BLD CALC: 40.6 % — SIGNIFICANT CHANGE UP (ref 39–50)
HGB BLD-MCNC: 13 G/DL — SIGNIFICANT CHANGE UP (ref 13–17)
MCHC RBC-ENTMCNC: 26.7 PG — LOW (ref 27–34)
MCHC RBC-ENTMCNC: 32 % — SIGNIFICANT CHANGE UP (ref 32–36)
MCV RBC AUTO: 83.4 FL — SIGNIFICANT CHANGE UP (ref 80–100)
PLATELET # BLD AUTO: 246 K/UL — SIGNIFICANT CHANGE UP (ref 150–400)
PMV BLD: 11.9 FL — SIGNIFICANT CHANGE UP (ref 7–13)
POTASSIUM SERPL-MCNC: 4 MMOL/L — SIGNIFICANT CHANGE UP (ref 3.5–5.3)
POTASSIUM SERPL-SCNC: 4 MMOL/L — SIGNIFICANT CHANGE UP (ref 3.5–5.3)
RBC # BLD: 4.87 M/UL — SIGNIFICANT CHANGE UP (ref 4.2–5.8)
RBC # FLD: 13.6 % — SIGNIFICANT CHANGE UP (ref 10.3–14.5)
SODIUM SERPL-SCNC: 137 MMOL/L — SIGNIFICANT CHANGE UP (ref 135–145)
WBC # BLD: 7.07 K/UL — SIGNIFICANT CHANGE UP (ref 3.8–10.5)
WBC # FLD AUTO: 7.07 K/UL — SIGNIFICANT CHANGE UP (ref 3.8–10.5)

## 2017-01-23 RX ADMIN — HEPARIN SODIUM 5000 UNIT(S): 5000 INJECTION INTRAVENOUS; SUBCUTANEOUS at 21:28

## 2017-01-23 RX ADMIN — SIMVASTATIN 20 MILLIGRAM(S): 20 TABLET, FILM COATED ORAL at 23:15

## 2017-01-23 RX ADMIN — Medication 120 MILLIGRAM(S): at 05:12

## 2017-01-23 RX ADMIN — OXYCODONE HYDROCHLORIDE 10 MILLIGRAM(S): 5 TABLET ORAL at 05:12

## 2017-01-23 RX ADMIN — HEPARIN SODIUM 5000 UNIT(S): 5000 INJECTION INTRAVENOUS; SUBCUTANEOUS at 05:12

## 2017-01-23 RX ADMIN — OXYCODONE HYDROCHLORIDE 10 MILLIGRAM(S): 5 TABLET ORAL at 18:17

## 2017-01-23 RX ADMIN — Medication 5 MILLIGRAM(S): at 05:16

## 2017-01-23 RX ADMIN — Medication 5 MILLIGRAM(S): at 18:17

## 2017-01-23 RX ADMIN — Medication 100 MILLIGRAM(S): at 05:12

## 2017-01-23 RX ADMIN — LIDOCAINE 2 PATCH: 4 CREAM TOPICAL at 12:20

## 2017-01-23 RX ADMIN — CYCLOBENZAPRINE HYDROCHLORIDE 10 MILLIGRAM(S): 10 TABLET, FILM COATED ORAL at 05:12

## 2017-01-23 RX ADMIN — LIDOCAINE 2 PATCH: 4 CREAM TOPICAL at 00:09

## 2017-01-23 RX ADMIN — HEPARIN SODIUM 5000 UNIT(S): 5000 INJECTION INTRAVENOUS; SUBCUTANEOUS at 13:39

## 2017-01-23 RX ADMIN — Medication 100 MILLIGRAM(S): at 21:28

## 2017-01-23 RX ADMIN — CYCLOBENZAPRINE HYDROCHLORIDE 10 MILLIGRAM(S): 10 TABLET, FILM COATED ORAL at 18:17

## 2017-01-23 RX ADMIN — Medication 100 MILLIGRAM(S): at 13:39

## 2017-01-24 LAB
BUN SERPL-MCNC: 13 MG/DL — SIGNIFICANT CHANGE UP (ref 7–23)
CALCIUM SERPL-MCNC: 9.5 MG/DL — SIGNIFICANT CHANGE UP (ref 8.4–10.5)
CHLORIDE SERPL-SCNC: 99 MMOL/L — SIGNIFICANT CHANGE UP (ref 98–107)
CO2 SERPL-SCNC: 27 MMOL/L — SIGNIFICANT CHANGE UP (ref 22–31)
CREAT SERPL-MCNC: 0.91 MG/DL — SIGNIFICANT CHANGE UP (ref 0.5–1.3)
GLUCOSE SERPL-MCNC: 101 MG/DL — HIGH (ref 70–99)
HCT VFR BLD CALC: 42.4 % — SIGNIFICANT CHANGE UP (ref 39–50)
HGB BLD-MCNC: 13.2 G/DL — SIGNIFICANT CHANGE UP (ref 13–17)
MCHC RBC-ENTMCNC: 26.2 PG — LOW (ref 27–34)
MCHC RBC-ENTMCNC: 31.1 % — LOW (ref 32–36)
MCV RBC AUTO: 84.1 FL — SIGNIFICANT CHANGE UP (ref 80–100)
PLATELET # BLD AUTO: 252 K/UL — SIGNIFICANT CHANGE UP (ref 150–400)
PMV BLD: 11.9 FL — SIGNIFICANT CHANGE UP (ref 7–13)
POTASSIUM SERPL-MCNC: 4.4 MMOL/L — SIGNIFICANT CHANGE UP (ref 3.5–5.3)
POTASSIUM SERPL-SCNC: 4.4 MMOL/L — SIGNIFICANT CHANGE UP (ref 3.5–5.3)
RBC # BLD: 5.04 M/UL — SIGNIFICANT CHANGE UP (ref 4.2–5.8)
RBC # FLD: 13.6 % — SIGNIFICANT CHANGE UP (ref 10.3–14.5)
SODIUM SERPL-SCNC: 140 MMOL/L — SIGNIFICANT CHANGE UP (ref 135–145)
WBC # BLD: 6.86 K/UL — SIGNIFICANT CHANGE UP (ref 3.8–10.5)
WBC # FLD AUTO: 6.86 K/UL — SIGNIFICANT CHANGE UP (ref 3.8–10.5)

## 2017-01-24 PROCEDURE — 72148 MRI LUMBAR SPINE W/O DYE: CPT | Mod: 26

## 2017-01-24 RX ADMIN — OXYCODONE HYDROCHLORIDE 10 MILLIGRAM(S): 5 TABLET ORAL at 19:00

## 2017-01-24 RX ADMIN — Medication 5 MILLIGRAM(S): at 05:23

## 2017-01-24 RX ADMIN — Medication 100 MILLIGRAM(S): at 05:16

## 2017-01-24 RX ADMIN — HEPARIN SODIUM 5000 UNIT(S): 5000 INJECTION INTRAVENOUS; SUBCUTANEOUS at 23:22

## 2017-01-24 RX ADMIN — Medication 100 MILLIGRAM(S): at 13:03

## 2017-01-24 RX ADMIN — LIDOCAINE 2 PATCH: 4 CREAM TOPICAL at 00:45

## 2017-01-24 RX ADMIN — OXYCODONE HYDROCHLORIDE 5 MILLIGRAM(S): 5 TABLET ORAL at 16:20

## 2017-01-24 RX ADMIN — CYCLOBENZAPRINE HYDROCHLORIDE 10 MILLIGRAM(S): 10 TABLET, FILM COATED ORAL at 18:07

## 2017-01-24 RX ADMIN — OXYCODONE HYDROCHLORIDE 10 MILLIGRAM(S): 5 TABLET ORAL at 18:07

## 2017-01-24 RX ADMIN — OXYCODONE HYDROCHLORIDE 5 MILLIGRAM(S): 5 TABLET ORAL at 15:27

## 2017-01-24 RX ADMIN — Medication 120 MILLIGRAM(S): at 05:16

## 2017-01-24 RX ADMIN — HEPARIN SODIUM 5000 UNIT(S): 5000 INJECTION INTRAVENOUS; SUBCUTANEOUS at 05:16

## 2017-01-24 RX ADMIN — SIMVASTATIN 20 MILLIGRAM(S): 20 TABLET, FILM COATED ORAL at 23:22

## 2017-01-24 RX ADMIN — LIDOCAINE 2 PATCH: 4 CREAM TOPICAL at 11:52

## 2017-01-24 RX ADMIN — HEPARIN SODIUM 5000 UNIT(S): 5000 INJECTION INTRAVENOUS; SUBCUTANEOUS at 13:03

## 2017-01-24 RX ADMIN — Medication 5 MILLIGRAM(S): at 18:07

## 2017-01-24 RX ADMIN — Medication 100 MILLIGRAM(S): at 23:22

## 2017-01-24 RX ADMIN — OXYCODONE HYDROCHLORIDE 10 MILLIGRAM(S): 5 TABLET ORAL at 05:16

## 2017-01-24 RX ADMIN — CYCLOBENZAPRINE HYDROCHLORIDE 10 MILLIGRAM(S): 10 TABLET, FILM COATED ORAL at 05:16

## 2017-01-25 VITALS
SYSTOLIC BLOOD PRESSURE: 117 MMHG | TEMPERATURE: 98 F | OXYGEN SATURATION: 96 % | DIASTOLIC BLOOD PRESSURE: 80 MMHG | HEART RATE: 98 BPM | RESPIRATION RATE: 18 BRPM

## 2017-01-25 LAB — ANA TITR SER: NEGATIVE — SIGNIFICANT CHANGE UP

## 2017-01-25 RX ORDER — CYCLOBENZAPRINE HYDROCHLORIDE 10 MG/1
1 TABLET, FILM COATED ORAL
Qty: 28 | Refills: 0 | OUTPATIENT
Start: 2017-01-25 | End: 2017-02-08

## 2017-01-25 RX ORDER — OXYCODONE HYDROCHLORIDE 5 MG/1
1 TABLET ORAL
Qty: 14 | Refills: 0 | OUTPATIENT
Start: 2017-01-25 | End: 2017-02-01

## 2017-01-25 RX ORDER — DOCUSATE SODIUM 100 MG
1 CAPSULE ORAL
Qty: 0 | Refills: 0 | COMMUNITY
Start: 2017-01-25

## 2017-01-25 RX ORDER — SENNA PLUS 8.6 MG/1
2 TABLET ORAL
Qty: 0 | Refills: 0 | COMMUNITY
Start: 2017-01-25

## 2017-01-25 RX ORDER — OXYCODONE HYDROCHLORIDE 5 MG/1
1 TABLET ORAL
Qty: 16 | Refills: 0 | OUTPATIENT
Start: 2017-01-25 | End: 2017-01-29

## 2017-01-25 RX ADMIN — HEPARIN SODIUM 5000 UNIT(S): 5000 INJECTION INTRAVENOUS; SUBCUTANEOUS at 05:32

## 2017-01-25 RX ADMIN — OXYCODONE HYDROCHLORIDE 10 MILLIGRAM(S): 5 TABLET ORAL at 09:00

## 2017-01-25 RX ADMIN — Medication 5 MILLIGRAM(S): at 05:32

## 2017-01-25 RX ADMIN — Medication 120 MILLIGRAM(S): at 05:32

## 2017-01-25 RX ADMIN — OXYCODONE HYDROCHLORIDE 10 MILLIGRAM(S): 5 TABLET ORAL at 08:03

## 2017-01-25 RX ADMIN — LIDOCAINE 2 PATCH: 4 CREAM TOPICAL at 12:32

## 2017-01-25 RX ADMIN — CYCLOBENZAPRINE HYDROCHLORIDE 10 MILLIGRAM(S): 10 TABLET, FILM COATED ORAL at 05:32

## 2017-01-25 RX ADMIN — Medication 100 MILLIGRAM(S): at 05:32

## 2017-01-25 NOTE — PHYSICAL THERAPY INITIAL EVALUATION ADULT - PATIENT PROFILE REVIEW, REHAB EVAL
yes/ACTIVITY: Bedrest; spoke w/ RODRIGUEZ Snowden ([id number]) who reports she will change activity order --> new activity order: ambulate as tolerated

## 2017-01-25 NOTE — PHYSICAL THERAPY INITIAL EVALUATION ADULT - ADDITIONAL COMMENTS
Pt. left seated @ EOB post-PT in NAD w/ all lines/tubes intact & phone/call bell within reach.  RICARDO dozier.

## 2017-01-25 NOTE — PHYSICAL THERAPY INITIAL EVALUATION ADULT - PREDICTED DURATION OF THERAPY (DAYS/WKS), PT EVAL
Pt. will not be placed on PT program @ this time 2/2 independently amb. 150' w/o AD & safely amb. up & down 8 stairs.

## 2017-01-25 NOTE — PHYSICAL THERAPY INITIAL EVALUATION ADULT - LIVES WITH, PROFILE
spouse/children/(2 children - 3 y/o and 1 y/o), apartment, 8-9 steps-to-enter/exit into lobby w/ U/L handrail, (+) elevator access

## 2017-01-25 NOTE — PHYSICAL THERAPY INITIAL EVALUATION ADULT - PERTINENT HX OF CURRENT PROBLEM, REHAB EVAL
Pt. is a 40 y/o male adm. to [facility] on 01/21/17 w/ a dx of low back strain/severe LBP x 1 day.  PT consult request 2/2 ortho trauma/surgery.  H/O DM.  MRI of L-spine = posterior annular tears = L4-5 and L5-S1.  (-) L-spine X-ray.  (-) CXR.

## 2020-11-17 NOTE — DISCHARGE NOTE ADULT - PROVIDER RX CONTACT NUMBER
No, this was likely cause of internally bleeding. Want to pause. Can max tylenol, 1gm tid for pains. And topical voltaren.    [phone]

## 2022-12-15 NOTE — PATIENT PROFILE ADULT. - NS PRO ABUSE SCREEN SUSPICION NEGLECT YN
REFERRING PHYSICIAN: CHANDRAKANT LAGUERRE    DATE:  12/14/2022    PREOPERATIVE DIAGNOSIS:  Displaced subcapital femoral neck hip fracture, right.    POSTOPERATIVE DIAGNOSIS:  Displaced subcapital femoral neck hip fracture, right.    OPERATIVE PROCEDURE:  Right hip hemiarthroplasty, Alexey Versys femoral stem press-fit LD/FX size 13, multipolar bipolar cup, 44 mm outer diameter, +3.5 neck, 28 mm femoral head.    ASSISTANT:  No assistant.    ANESTHESIA:  Spinal.    ESTIMATED BLOOD LOSS:  150 cc.    DRAIN:  No Hemovac drain.    OPERATIVE TECHNIQUE:  The patient is a 78-year-old who appears much older than stated age.  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  She has a history of falls at home, uses a walker.  Reportedly suffered a mechanical fall recently.  Was seen at [facility] on December 13, 2022, admitted after x-ray showed the above fracture.  Our service was on-call for Orthopedics and was consulted on the case.  Surgery was recommended.   and patient are in agreement.  Risks and benefits including, but not limited to DVT, pulmonary embolus, infection, fracture, dislocation, need for further surgery were explained and understood.  The patient was medically optimized by the hospitalist team and presents for the above procedure.     After informed consent was obtained, surgical site marked and time-out called.  The patient was brought under adequate spinal anesthetic, placed in the left lateral decubitus position on the peg board.  The right hip sterilely prepped and draped with alcohol and ChloraPrep.  Lateral incision made.  Fascia divided.  Anterolateral approach utilized with the flap of the gluteus medius and minimus.  The anterior hip capsulotomy performed and the femoral neck fracture visualized, soft bone, neck osteotomy performed at the appropriate level with the saw, head removed and measured to be 44 mm.  Trial head fit well.  Next, the leg was  placed in the side pocket.  Progressive broaching up to size 13 performed.  Trial reduction performed.  Stable in extension, external rotation, as well as 90 degrees of flexion and internal rotation.  This was accepted.  Hip was dislocated.  Trial components removed.  The actual above components assembled and impacted into the patient.  Excellent fit and fill, no fractures, reduced, equal leg lengths, excellent stability.  Mild oozing of blood throughout the case, easily controlled with electrocautery.  Copious irrigation was used as well asthat was washed out.  A 1000 mg of vancomycin powder were placed deep into the wound.  Anterior muscle flap repaired interrupted running #1 Ethibond and 2 large Mersilene.  Fascia closed with a running #1 Stratafix, subcu with interrupted 2-0 Monocryl, skin with staples.  Aquacel dressing placed.  The patient transferred to the bed with pillows between the legs and tolerated the procedure well.  Immediately postoperatively, operative findings were discussed with the patient's  on the phone as well as postoperative plan.  He is understanding and all questions answered.  Phone #[phone].        Dictated By:  Cj Pittman MD        D:  12/14/2022 18:40:38  T:  12/15/2022 04:46:57  JARROD/suzanna  Job:  [id number]      cc:  CHANDRAKANT LAGUERRE   no

## 2023-06-21 ENCOUNTER — EMERGENCY (EMERGENCY)
Facility: HOSPITAL | Age: 48
LOS: 1 days | Discharge: HOME | End: 2023-06-21
Attending: STUDENT IN AN ORGANIZED HEALTH CARE EDUCATION/TRAINING PROGRAM | Admitting: STUDENT IN AN ORGANIZED HEALTH CARE EDUCATION/TRAINING PROGRAM
Payer: MEDICAID

## 2023-06-21 VITALS
TEMPERATURE: 98 F | SYSTOLIC BLOOD PRESSURE: 139 MMHG | HEART RATE: 72 BPM | OXYGEN SATURATION: 100 % | DIASTOLIC BLOOD PRESSURE: 95 MMHG | RESPIRATION RATE: 16 BRPM

## 2023-06-21 PROCEDURE — 99284 EMERGENCY DEPT VISIT MOD MDM: CPT

## 2023-06-21 NOTE — ED PROVIDER NOTE - NSFOLLOWUPCLINICS_GEN_ALL_ED_FT
[facility]  Dental  [address] [place] [address]  Phone: [phone]  Fax:     Oral & Maxillofacial Surgery  Department of Dental Medicine  [phone] [place], [place] [address]  Phone: [phone]  Fax: [phone]

## 2023-06-21 NOTE — ED PROVIDER NOTE - NSICDXPASTMEDICALHX_GEN_ALL_CORE_FT
PAST MEDICAL HISTORY:  Chronic back pain     Diabetes     High cholesterol     HTN (hypertension)

## 2023-06-21 NOTE — ED PROVIDER NOTE - PATIENT PORTAL LINK FT
You can access the FollowMyHealth Patient Portal offered by [facility] by registering at the following website: [email or web address]. By joining [employer]’s FollowMyHealth portal, you will also be able to view your health information using other applications (apps) compatible with our system.

## 2023-06-21 NOTE — ED PROVIDER NOTE - OBJECTIVE STATEMENT
48-year-old male history of hypertension, diabetes on diltiazem, atorvastatin, was started on metformin 2 weeks prior to onset of dysgeusia symptoms, presents with 4 weeks of salty taste in mouth.  Patient was referred to dental surgeon on by PMD and dentist, saw dental surgeon yesterday, states he is unsure how to help the patient.  Patient has normal vitals, no acute dental abscess.

## 2023-06-21 NOTE — ED PROVIDER NOTE - ATTENDING CONTRIBUTION TO CARE
48-year-old male with past medical history of hypertension, diabetes coming in with salty taste in his mouth.  States he wakes up in the morning and feels like his mucosa is sticking to his gums.  Dental pain with chewing eating and drinking.  States he has been seen by oral surgeon, dentist and PMD -he was told to follow-up with dental pathologist.  States he could not find one in his area.  Denies fevers chills neck pain chest pain shortness of breath.  No gum swelling appreciated on exam.  No tenderness to palpation of the teeth.  Noted gums are discolored -dark in appearance. No white patches noted.  Patient is a smoker for over 20 years. Concern for gum disease. Pt is stable at this time for dc - will cancel labs. fu with dentist - pt wants another dentist.

## 2023-06-21 NOTE — ED PROVIDER NOTE - CLINICAL SUMMARY MEDICAL DECISION MAKING FREE TEXT BOX
48-year-old male history of hypertension, diabetes on diltiazem, atorvastatin, was started on metformin 2 weeks prior to onset of dysgeusia symptoms, presents with 4 weeks of salty taste in mouth.  Patient was referred to dental surgeon on by PMD and dentist, saw dental surgeon yesterday, states he is unsure how to help the patient.  Patient has normal vitals, no acute dental abscess.     On exam, very dry oral mucosa, no dental abscess noted.    Differential includes, but not limited to: Xerostomia, gingivitis, medication side effect, GERD, vitamin deficiency.  We will check basic labs for electrolyte abnormalities, and facilitate outpatient follow-up with dental and second opinion PMD.

## 2023-06-21 NOTE — ED PROVIDER NOTE - PHYSICAL EXAMINATION
Gen: Well appearing in NAD   Head: NC/AT  Mouth: Dry mucous membranes, no dental abscess, friable gingiva.   Neck: trachea midline  Resp:  No distress  Ext: no deformities  Neuro:  A&O appears non focal  Skin:  Warm and dry as visualized  Psych:  Normal affect and mood

## 2023-06-22 PROBLEM — I10 ESSENTIAL (PRIMARY) HYPERTENSION: Chronic | Status: ACTIVE | Noted: 2017-01-20

## 2023-06-22 PROBLEM — E11.9 TYPE 2 DIABETES MELLITUS WITHOUT COMPLICATIONS: Chronic | Status: ACTIVE | Noted: 2017-01-21

## 2023-06-22 PROBLEM — M54.9 DORSALGIA, UNSPECIFIED: Chronic | Status: ACTIVE | Noted: 2017-01-20

## 2023-06-22 PROBLEM — E78.00 PURE HYPERCHOLESTEROLEMIA, UNSPECIFIED: Chronic | Status: ACTIVE | Noted: 2017-01-21

## 2023-08-01 ENCOUNTER — EMERGENCY (EMERGENCY)
Facility: HOSPITAL | Age: 48
LOS: 1 days | Discharge: HOME | End: 2023-08-01
Admitting: EMERGENCY MEDICINE
Payer: MEDICAID

## 2023-08-01 VITALS
DIASTOLIC BLOOD PRESSURE: 96 MMHG | TEMPERATURE: 98 F | RESPIRATION RATE: 18 BRPM | SYSTOLIC BLOOD PRESSURE: 151 MMHG | HEART RATE: 93 BPM | OXYGEN SATURATION: 97 %

## 2023-08-01 PROCEDURE — 99284 EMERGENCY DEPT VISIT MOD MDM: CPT

## 2023-08-01 RX ORDER — LIDOCAINE 4 G/100G
1 CREAM TOPICAL ONCE
Refills: 0 | Status: COMPLETED | OUTPATIENT
Start: 2023-08-01 | End: 2023-08-01

## 2023-08-01 RX ORDER — DIAZEPAM 5 MG
5 TABLET ORAL ONCE
Refills: 0 | Status: DISCONTINUED | OUTPATIENT
Start: 2023-08-01 | End: 2023-08-01

## 2023-08-01 RX ORDER — DIAZEPAM 5 MG
1 TABLET ORAL
Qty: 9 | Refills: 0
Start: 2023-08-01 | End: 2023-08-03

## 2023-08-01 RX ORDER — KETOROLAC TROMETHAMINE 30 MG/ML
30 SYRINGE (ML) INJECTION ONCE
Refills: 0 | Status: DISCONTINUED | OUTPATIENT
Start: 2023-08-01 | End: 2023-08-01

## 2023-08-01 RX ADMIN — Medication 30 MILLIGRAM(S): at 19:49

## 2023-08-01 RX ADMIN — Medication 30 MILLIGRAM(S): at 20:41

## 2023-08-01 RX ADMIN — Medication 5 MILLIGRAM(S): at 19:49

## 2023-08-01 RX ADMIN — LIDOCAINE 1 PATCH: 4 CREAM TOPICAL at 19:50

## 2023-08-01 NOTE — ED PROVIDER NOTE - PATIENT PORTAL LINK FT
You can access the FollowMyHealth Patient Portal offered by [facility] by registering at the following website: [email or web address]. By joining [employer]’s FollowMyHealth portal, you will also be able to view your health information using other applications (apps) compatible with our system.

## 2023-08-01 NOTE — ED PROVIDER NOTE - OBJECTIVE STATEMENT
47 yo male pmh htn, chronic back pain c/o R lower back pain x10 days. Pt states that about 10 days ago he was picking up a bag and sneezed, feeling pain immidiately. Pt went to his pmd and was prescribed medications with some relief. About 4-5 days ago the pain became aggravated again. Pt went back to his PMD who prescribed another medication. Pt is still not feeling better, prompting ER visit. Pt admits to worsneing pain with movement and bending over. Denies radiating pain, numbness, tingling, weakness, bowel or bladder incontinence, dizziness, syncope, fever or chills.

## 2023-08-01 NOTE — ED ADULT NURSE NOTE - OBJECTIVE STATEMENT
Break Shift RN: Pt received to wellness c/o lower back pain x 10 days worse today. Reports discomfort began after bending over packing bag, states he felt a "pull." Pt a&ox4, ambulatory at baseline, skin intact, respirations even and unlabored. Awaiting provider eval, will await orders and continue to monitor.

## 2023-08-01 NOTE — ED ADULT TRIAGE NOTE - CHIEF COMPLAINT QUOTE
Pt presents to ED ambulatory from home with c/o back pain x 10 days. Pt was seen outpatient by MD was started on new medications with no relief. Pt denies fall injury or trauma. Pt reports he was bent over packing a bag when he sneezed and felt something pull now causing pain.

## 2023-08-01 NOTE — ED PROVIDER NOTE - PHYSICAL EXAMINATION
back- no midline tenderness. + R paraspinal tenderness in LS region, straight leg raise +, 5/5 muscle strength b/l LE, distal sensation intact

## 2023-08-01 NOTE — ED ADULT NURSE REASSESSMENT NOTE - NS ED NURSE REASSESS COMMENT FT1
patient received from Break covering RICARDO Jain at 2000. patient in no acute distress at this time.

## 2023-08-01 NOTE — ED ADULT NURSE NOTE - NSFALLUNIVINTERV_ED_ALL_ED
Bed/Stretcher in lowest position, wheels locked, appropriate side rails in place/Call bell, personal items and telephone in reach/Instruct patient to call for assistance before getting out of bed/chair/stretcher/Non-slip footwear applied when patient is off stretcher/[place] to call system/Physically safe environment - no spills, clutter or unnecessary equipment/Purposeful proactive rounding/Room/bathroom lighting operational, light cord in reach

## 2023-08-01 NOTE — ED PROVIDER NOTE - CLINICAL SUMMARY MEDICAL DECISION MAKING FREE TEXT BOX
49 y/o male pmh htn, dm, chronic back pain c/o R lower back pain x10 days. Pain began after bending over and sneezing at the same time. Pt has been trying meloxicam and flexeril with minimal relief. Pt denies numbness, tingling, weakness, bowel or bladder incontinence. Denies fever or chills.- Pt well appearing but uncomfortable. no midline tenderness, no neuro deficits on exam, able to ambulate. no concern for cord compression or cauda equina. likely MSK strain vs herniated disc. WIll treat with nsaids and muscle relaxers. will refer to spine center for close f/u.

## 2025-06-02 NOTE — H&P ADULT. - VENOUS THROMBOEMBOLISM HISTORY
negative history